# Patient Record
(demographics unavailable — no encounter records)

---

## 2025-03-19 NOTE — IMAGING
[Left] : left rib [No bony abnormalities] : No bony abnormalities [de-identified] : No swelling, ecchymosis, erythema Tender to palpation along 8th and 9th  No palpable defects
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
